# Patient Record
Sex: MALE | Race: OTHER | Employment: UNEMPLOYED | URBAN - METROPOLITAN AREA
[De-identification: names, ages, dates, MRNs, and addresses within clinical notes are randomized per-mention and may not be internally consistent; named-entity substitution may affect disease eponyms.]

---

## 2021-10-18 ENCOUNTER — APPOINTMENT (OUTPATIENT)
Dept: CT IMAGING | Age: 26
End: 2021-10-18
Payer: MEDICAID

## 2021-10-18 ENCOUNTER — HOSPITAL ENCOUNTER (EMERGENCY)
Age: 26
Discharge: LEFT BEFORE DISCHARGE | End: 2021-10-19
Attending: STUDENT IN AN ORGANIZED HEALTH CARE EDUCATION/TRAINING PROGRAM
Payer: MEDICAID

## 2021-10-18 VITALS
HEART RATE: 85 BPM | OXYGEN SATURATION: 100 % | DIASTOLIC BLOOD PRESSURE: 64 MMHG | WEIGHT: 200 LBS | TEMPERATURE: 98.2 F | HEIGHT: 70 IN | RESPIRATION RATE: 12 BRPM | BODY MASS INDEX: 28.63 KG/M2 | SYSTOLIC BLOOD PRESSURE: 110 MMHG

## 2021-10-18 DIAGNOSIS — M27.2 MANDIBULAR ABSCESS: Primary | ICD-10-CM

## 2021-10-18 LAB
ALBUMIN SERPL-MCNC: 4.1 GM/DL (ref 3.4–5)
ALP BLD-CCNC: 82 IU/L (ref 40–128)
ALT SERPL-CCNC: 11 U/L (ref 10–40)
ANION GAP SERPL CALCULATED.3IONS-SCNC: 10 MMOL/L (ref 4–16)
AST SERPL-CCNC: 13 IU/L (ref 15–37)
BASOPHILS ABSOLUTE: 0.1 K/CU MM
BASOPHILS RELATIVE PERCENT: 0.3 % (ref 0–1)
BILIRUB SERPL-MCNC: 1.5 MG/DL (ref 0–1)
BUN BLDV-MCNC: 9 MG/DL (ref 6–23)
CALCIUM SERPL-MCNC: 9 MG/DL (ref 8.3–10.6)
CHLORIDE BLD-SCNC: 94 MMOL/L (ref 99–110)
CO2: 25 MMOL/L (ref 21–32)
CREAT SERPL-MCNC: 0.8 MG/DL (ref 0.9–1.3)
DIFFERENTIAL TYPE: ABNORMAL
EOSINOPHILS ABSOLUTE: 0.5 K/CU MM
EOSINOPHILS RELATIVE PERCENT: 3.2 % (ref 0–3)
GFR AFRICAN AMERICAN: >60 ML/MIN/1.73M2
GFR NON-AFRICAN AMERICAN: >60 ML/MIN/1.73M2
GLUCOSE BLD-MCNC: 100 MG/DL (ref 70–99)
HCT VFR BLD CALC: 38.7 % (ref 42–52)
HEMOGLOBIN: 12.5 GM/DL (ref 13.5–18)
IMMATURE NEUTROPHIL %: 0.3 % (ref 0–0.43)
LACTATE: 1.1 MMOL/L (ref 0.4–2)
LYMPHOCYTES ABSOLUTE: 2.3 K/CU MM
LYMPHOCYTES RELATIVE PERCENT: 15.3 % (ref 24–44)
MCH RBC QN AUTO: 30.3 PG (ref 27–31)
MCHC RBC AUTO-ENTMCNC: 32.3 % (ref 32–36)
MCV RBC AUTO: 93.9 FL (ref 78–100)
MONOCYTES ABSOLUTE: 1.3 K/CU MM
MONOCYTES RELATIVE PERCENT: 8.4 % (ref 0–4)
NUCLEATED RBC %: 0 %
PDW BLD-RTO: 12.5 % (ref 11.7–14.9)
PLATELET # BLD: 326 K/CU MM (ref 140–440)
PMV BLD AUTO: 10.1 FL (ref 7.5–11.1)
POTASSIUM SERPL-SCNC: 3.6 MMOL/L (ref 3.5–5.1)
RBC # BLD: 4.12 M/CU MM (ref 4.6–6.2)
SEGMENTED NEUTROPHILS ABSOLUTE COUNT: 10.9 K/CU MM
SEGMENTED NEUTROPHILS RELATIVE PERCENT: 72.5 % (ref 36–66)
SODIUM BLD-SCNC: 129 MMOL/L (ref 135–145)
TOTAL IMMATURE NEUTOROPHIL: 0.04 K/CU MM
TOTAL NUCLEATED RBC: 0 K/CU MM
TOTAL PROTEIN: 7.1 GM/DL (ref 6.4–8.2)
WBC # BLD: 15.1 K/CU MM (ref 4–10.5)

## 2021-10-18 PROCEDURE — 2580000003 HC RX 258: Performed by: STUDENT IN AN ORGANIZED HEALTH CARE EDUCATION/TRAINING PROGRAM

## 2021-10-18 PROCEDURE — 99284 EMERGENCY DEPT VISIT MOD MDM: CPT

## 2021-10-18 PROCEDURE — 96374 THER/PROPH/DIAG INJ IV PUSH: CPT

## 2021-10-18 PROCEDURE — 6360000004 HC RX CONTRAST MEDICATION: Performed by: STUDENT IN AN ORGANIZED HEALTH CARE EDUCATION/TRAINING PROGRAM

## 2021-10-18 PROCEDURE — 6360000002 HC RX W HCPCS: Performed by: STUDENT IN AN ORGANIZED HEALTH CARE EDUCATION/TRAINING PROGRAM

## 2021-10-18 PROCEDURE — 83605 ASSAY OF LACTIC ACID: CPT

## 2021-10-18 PROCEDURE — 85025 COMPLETE CBC W/AUTO DIFF WBC: CPT

## 2021-10-18 PROCEDURE — 96361 HYDRATE IV INFUSION ADD-ON: CPT

## 2021-10-18 PROCEDURE — 96375 TX/PRO/DX INJ NEW DRUG ADDON: CPT

## 2021-10-18 PROCEDURE — 70487 CT MAXILLOFACIAL W/DYE: CPT

## 2021-10-18 PROCEDURE — 6360000002 HC RX W HCPCS: Performed by: PHYSICIAN ASSISTANT

## 2021-10-18 PROCEDURE — 80053 COMPREHEN METABOLIC PANEL: CPT

## 2021-10-18 RX ORDER — KETOROLAC TROMETHAMINE 30 MG/ML
30 INJECTION, SOLUTION INTRAMUSCULAR; INTRAVENOUS ONCE
Status: COMPLETED | OUTPATIENT
Start: 2021-10-18 | End: 2021-10-18

## 2021-10-18 RX ORDER — FENTANYL CITRATE 50 UG/ML
50 INJECTION, SOLUTION INTRAMUSCULAR; INTRAVENOUS ONCE
Status: COMPLETED | OUTPATIENT
Start: 2021-10-18 | End: 2021-10-18

## 2021-10-18 RX ORDER — DEXAMETHASONE SODIUM PHOSPHATE 10 MG/ML
10 INJECTION, SOLUTION INTRAMUSCULAR; INTRAVENOUS EVERY 6 HOURS
Status: DISCONTINUED | OUTPATIENT
Start: 2021-10-18 | End: 2021-10-19 | Stop reason: HOSPADM

## 2021-10-18 RX ORDER — 0.9 % SODIUM CHLORIDE 0.9 %
500 INTRAVENOUS SOLUTION INTRAVENOUS ONCE
Status: COMPLETED | OUTPATIENT
Start: 2021-10-18 | End: 2021-10-19

## 2021-10-18 RX ADMIN — FENTANYL CITRATE 50 MCG: 0.05 INJECTION, SOLUTION INTRAMUSCULAR; INTRAVENOUS at 20:30

## 2021-10-18 RX ADMIN — SODIUM CHLORIDE 500 ML: 9 INJECTION, SOLUTION INTRAVENOUS at 21:42

## 2021-10-18 RX ADMIN — IOPAMIDOL 75 ML: 755 INJECTION, SOLUTION INTRAVENOUS at 20:08

## 2021-10-18 RX ADMIN — KETOROLAC TROMETHAMINE 30 MG: 30 INJECTION, SOLUTION INTRAMUSCULAR; INTRAVENOUS at 20:30

## 2021-10-18 RX ADMIN — DEXAMETHASONE SODIUM PHOSPHATE 10 MG: 10 INJECTION, SOLUTION INTRAMUSCULAR; INTRAVENOUS at 23:34

## 2021-10-18 ASSESSMENT — PAIN SCALES - GENERAL
PAINLEVEL_OUTOF10: 5
PAINLEVEL_OUTOF10: 7
PAINLEVEL_OUTOF10: 6

## 2021-10-18 ASSESSMENT — PAIN DESCRIPTION - DESCRIPTORS: DESCRIPTORS: SHARP

## 2021-10-18 ASSESSMENT — PAIN DESCRIPTION - ORIENTATION: ORIENTATION: LEFT

## 2021-10-18 ASSESSMENT — PAIN DESCRIPTION - ONSET: ONSET: ON-GOING

## 2021-10-18 ASSESSMENT — PAIN DESCRIPTION - PAIN TYPE: TYPE: ACUTE PAIN

## 2021-10-18 ASSESSMENT — PAIN DESCRIPTION - LOCATION: LOCATION: MOUTH

## 2021-10-18 ASSESSMENT — PAIN DESCRIPTION - PROGRESSION: CLINICAL_PROGRESSION: GRADUALLY WORSENING

## 2021-10-18 ASSESSMENT — PAIN DESCRIPTION - FREQUENCY: FREQUENCY: INTERMITTENT

## 2021-10-18 NOTE — ED PROVIDER NOTES
As PA-in-triage, I performed a medical screening history and physical exam on this patient. HISTORY OF PRESENT ILLNESS  Marcial Sparks is a 22 y.o. male presents for left lower dental pain and facial swelling worsening over last 3 days. States symptoms began aroudn left lower molar with previous silver filling placement but has had worsening facial swelling to the jaw with pain and swelling to the submental region. States went to family dentist today who sent him to ED for imaging and IV abx. PHYSICAL EXAM  /64   Pulse 85   Temp 98.2 °F (36.8 °C) (Oral)   Resp 12   Ht 5' 10\" (1.778 m)   Wt 200 lb (90.7 kg)   SpO2 100%   BMI 28.70 kg/m²     On exam, the patient appears well-hydrated, well-nourished, and in no acute distress. Mucous membranes are moist. Speech is clear. Breathing is unlabored. Skin is dry. Mental status is normal. The patient has normal gait, moves all extremities, and is without facial droop. Moderate left lower jaw swelling with mild trismus. Pain to palpation of submental region with mild induration. Sublingual floor is soft/nontender. Labs, imaging and medications ordered at triage. Please see ED provider's note for patient complete ED evaluation, labs/imaging interpretation and final disposition/clinical impression.          Catina Musa PA-C  10/18/21 2185

## 2021-10-19 RX ORDER — PREDNISONE 50 MG/1
50 TABLET ORAL DAILY
Qty: 5 TABLET | Refills: 0 | Status: SHIPPED | OUTPATIENT
Start: 2021-10-19 | End: 2021-10-24

## 2021-10-19 RX ORDER — CLINDAMYCIN HYDROCHLORIDE 150 MG/1
450 CAPSULE ORAL 3 TIMES DAILY
Qty: 90 CAPSULE | Refills: 0 | Status: SHIPPED | OUTPATIENT
Start: 2021-10-19 | End: 2021-10-29

## 2021-11-08 NOTE — ED PROVIDER NOTES
Emergency Department Encounter    Patient: Jeanne Verde  MRN: [id number]  : 1995  Date of Evaluation: 2021  ED Provider:  Guy Welsh MD    Triage Chief Complaint:   Dental Pain (swelling ans pain to right side of face )    [place]:  Jeanne Verde is a 22 y.o. male presenting with left-sided dental pain and facial swelling. Patient states over the past 2 days has had increasing pain over one of his left mandibular molars began having significantly increased swelling today and over the left side of his face into his submandibular area. Patient denies shortness of breath. States he is not having difficulty swallowing or handling secretions. Denies fevers or chills. Denies recent falls or trauma. States he is trying to get into a dentist around town but has not been unable to do so. States the pain is a 10 out of 10, constant, stabbin/throbbing worse with palpation without relieving factors. Denies any auditory changes, visual changes, headache, blurred vision, focal neuro deficits, motor or sensory changes, chest pain or shortness of breath, abdominal pain, change in urination, change in bowel habits. ROS - see HPI, below listed is current ROS at time of my eval:  At least 10 point review of system reviewed, negative other HPI. No past medical history on file. No past surgical history on file. No family history on file.   Social History     Socioeconomic History    Marital status: Single     Spouse name: Not on file    Number of children: Not on file    Years of education: Not on file    Highest education level: Not on file   Occupational History    Not on file   Tobacco Use    Smoking status: Not on file    Smokeless tobacco: Not on file   Substance and Sexual Activity    Alcohol use: Not on file    Drug use: Not on file    Sexual activity: Not on file   Other Topics Concern    Not on file   Social History Narrative    Not on file     Social Determinants of Health Financial Resource Strain:     Difficulty of Paying Living Expenses: Not on file   Food Insecurity:     Worried About Running Out of Food in the Last Year: Not on file    Radha of Food in the Last Year: Not on file   Transportation Needs:     Lack of Transportation (Medical): Not on file    Lack of Transportation (Non-Medical): Not on file   Physical Activity:     Days of Exercise per Week: Not on file    Minutes of Exercise per Session: Not on file   Stress:     Feeling of Stress : Not on file   Social Connections:     Frequency of Communication with Friends and Family: Not on file    Frequency of Social Gatherings with Friends and Family: Not on file    Attends [employer] Services: Not on file    Active Member of [address] [place] [address] [employer] or Organizations: Not on file    Attends Club or Organization Meetings: Not on file    Marital Status: Not on file   Intimate Partner Violence:     Fear of Current or Ex-Partner: Not on file    Emotionally Abused: Not on file    Physically Abused: Not on file    Sexually Abused: Not on file   Housing Stability:     Unable to Pay for Housing in the Last Year: Not on file    Number of [place] in the Last Year: Not on file    Unstable Housing in the Last Year: Not on file     No current facility-administered medications for this encounter. No current outpatient medications on file. No Known Allergies    Nursing Notes Reviewed    Physical Exam:  Triage VS:    ED Triage Vitals   Enc Vitals Group      BP 10/18/21 1451 110/64      Pulse 10/18/21 1451 85      Resp 10/18/21 1456 12      Temp 10/18/21 1451 98.2 °F (36.8 °C)      Temp Source 10/18/21 1451 Oral      SpO2 10/18/21 1456 100 %      Weight 10/18/21 1451 200 lb (90.7 kg)      Height 10/18/21 1451 5' 10\" (1.778 m)      Head Circumference --       Peak Flow --       Pain Score --       Pain Loc --       Pain Edu? --       Excl.  in [address]? --        My pulse ox interpretation is - normal    General appearance:  No acute distress. Skin:  Warm. Dry. Eye:  Extraocular movements intact. Ears, nose, mouth and throat:  Oral mucosa moist   Neck:  Trachea midline. Significant swelling over the left side of the face with tenderness palpation over the left mandible extending into the left submandibular area, there is some mild fluctuance, patient has point tenderness to palpation along left mandibular molar, no obvious drainable abscess on the gumline, posterior pharynx is clear, uvula is midline and posterior pharynx is symmetric, no significant lymphadenopathy palpated, no tenderness to palpation along the neck other than the submandibular area, patient appears well. Exam lungs are clear to auscultation without stridor or wheezing  Extremity:  No swelling. Normal ROM     Heart:  Regular rate and rhythm, normal S1 & S2, no extra heart sounds. Perfusion:  intact  Respiratory:  Lungs clear to auscultation bilaterally. Respirations nonlabored. Abdominal:  Normal bowel sounds. Soft. Nontender. Non distended. Back:  No CVA tenderness to palpation     Neurological:  Alert and oriented times 3. No focal neuro deficits.              Psychiatric:  Appropriate    I have reviewed and interpreted all of the currently available lab results from this visit (if applicable):  Results for orders placed or performed during the hospital encounter of 10/18/21   CBC auto diff   Result Value Ref Range    WBC 15.1 (H) 4.0 - 10.5 K/CU MM    RBC 4.12 (L) 4.6 - 6.2 M/CU MM    Hemoglobin 12.5 (L) 13.5 - 18.0 GM/DL    Hematocrit 38.7 (L) 42 - 52 %    MCV 93.9 78 - 100 FL    MCH 30.3 27 - 31 PG    MCHC 32.3 32.0 - 36.0 %    RDW 12.5 11.7 - 14.9 %    Platelets 594 037 - 207 K/CU MM    MPV 10.1 7.5 - 11.1 FL    Differential Type AUTOMATED DIFFERENTIAL     Segs Relative 72.5 (H) 36 - 66 %    Lymphocytes % 15.3 (L) 24 - 44 %    Monocytes % 8.4 (H) 0 - 4 %    Eosinophils % 3.2 (H) 0 - 3 %    Basophils % 0.3 0 - 1 %    Segs Absolute 10.9 K/CU MM Lymphocytes Absolute 2.3 K/CU MM    Monocytes Absolute 1.3 K/CU MM    Eosinophils Absolute 0.5 K/CU MM    Basophils Absolute 0.1 K/CU MM    Nucleated RBC % 0.0 %    Total Nucleated RBC 0.0 K/CU MM    Total Immature Neutrophil 0.04 K/CU MM    Immature Neutrophil % 0.3 0 - 0.43 %   CMP   Result Value Ref Range    Sodium 129 (L) 135 - 145 MMOL/L    Potassium 3.6 3.5 - 5.1 MMOL/L    Chloride 94 (L) 99 - 110 mMol/L    CO2 25 21 - 32 MMOL/L    BUN 9 6 - 23 MG/DL    CREATININE 0.8 (L) 0.9 - 1.3 MG/DL    Glucose 100 (H) 70 - 99 MG/DL    Calcium 9.0 8.3 - 10.6 MG/DL    Albumin 4.1 3.4 - 5.0 GM/DL    Total Protein 7.1 6.4 - 8.2 GM/DL    Total Bilirubin 1.5 (H) 0.0 - 1.0 MG/DL    ALT 11 10 - 40 U/L    AST 13 (L) 15 - 37 IU/L    Alkaline Phosphatase 82 40 - 128 IU/L    GFR Non-African American >60 >60 mL/min/1.73m2    GFR African American >60 >60 mL/min/1.73m2    Anion Gap 10 4 - 16   Lactic Acid, Plasma   Result Value Ref Range    Lactate 1.1 0.4 - 2.0 mMOL/L      Radiographs (if obtained):  Radiologist's Report Reviewed:  CT FACIAL BONES W CONTRAST    Result Date: 10/18/2021  EXAMINATION: CT OF THE FACE WITH CONTRAST 10/18/2021 TECHNIQUE: CT of the face was performed with the administration of intravenous contrast. Multiplanar reformatted images are provided for review. Dose modulation, iterative reconstruction, and/or weight based adjustment of the mA/kV was utilized to reduce the radiation dose to as low as reasonably achievable. COMPARISON: None.  HISTORY: ORDERING SYSTEM PROVIDED HISTORY: left jaw/dental pain, submental swelling and pain eval for ludwigs TECHNOLOGIST PROVIDED HISTORY: Reason for exam:->left jaw/dental pain, submental swelling and pain eval for ludwigs Decision Support Exception - unselect if not a suspected or confirmed emergency medical condition->Emergency Medical Condition (MA) Reason for Exam: left jaw/dental pain, submental swelling and pain eval for ludwigs Acuity: Acute Type of Exam: Initial FINDINGS: PHARYNX/LARYNX: The palatine tonsils are normal in appearance. The tongue is normal in appearance. The valleculae, epiglottis, aryepiglottic folds and pyriform sinuses appear unremarkable. No mass or abscess is seen. SALIVARY GLANDS: The parotid and submandibular glands appear unremarkable. LYMPH NODES: There are enlarged bilateral cervical lymph nodes. SOFT TISSUES: There is extensive skin thickening and subcutaneous stranding overlying the left mandible. There is a small fluid collection along the left vivi mandible measuring up to 1 cm. BRAIN/ORBITS/SINUSES: The visualized portion of the intracranial contents appear unremarkable. The visualized portion of the orbits and mastoid air cells demonstrate no acute abnormality. There is scattered mucosal thickening in the paranasal sinuses. BONES:  There are multiple dental caries with periapical lucency in the left maxilla and bilateral mandible. 1. Left perimandibular abscess measuring up to 1 cm. There is associated soft tissue edema overlying the left mandible. 2. Periodontal disease. 3. Enlarged cervical lymph nodes, likely reactive. MDM:    55-year-old male presenting with left-sided facial swelling and dental pain. History and be seen above. Vitals on presentation are reassuring and patient afebrile satting well on room air. On physical exam patient has significant swelling over the left side of his face extending the submandibular area. Lungs are clear to auscultation without wheezing or stridor. Posterior pharynx is clear and symmetric. CBC reveals a white count of 15,000. Patient is mildly hyponatremic at 129 with a chloride of 94. CT was obtained showing the left perimandibular abscess measuring about 1 cm with associated soft tissue edema underlying the left mandible, periodontal disease as well as enlarged cervical lymph nodes likely reactive.   Discussion with patient would like to admit patient with IV antibiotics and further evaluation and treatment. We do not have any ENT coverage at this time. Did attempt to transfer patient but no feel that facilities were excepting patients at this time. Gave patient Unasyn as well as Decadron in the ED. Discussed with patient that would like to keep him for IV antibiotics and monitoring until we are able to transfer patient. Patient states he does not want to stay in the hospital at this time. I discussed the potential for airway compromise, death as well as worsening infection and patient is understanding and understands the risk and benefits. Patient will be given antibiotics and steroids. Patient states if we are unable to transfer him he does not want to have continued monitoring. States he  would like to be discharged and will drive [facility] to [facility] ED for further evaluation and treatment. I discussed the risk of this and patient is very alert and oriented and able to make his own decisions. Patient signed AMA paperwork patient discharged home. Clinical Impression:  1.  Mandibular abscess      Disposition referral (if applicable):  [facility] Emergency Department  [place] [id number] [phone]    If symptoms worsen    Johan Dolan MD  [address] [facility]  [phone]      Call tomorrow to set up follow-up appointment soon as able    Disposition medications (if applicable):  Discharge Medication List as of 10/19/2021 12:18 AM      START taking these medications    Details   clindamycin (CLEOCIN) 150 MG capsule Take 3 capsules by mouth 3 times daily for 10 days, Disp-90 capsule, R-0Print      predniSONE (DELTASONE) 50 MG tablet Take 1 tablet by mouth daily for 5 days, Disp-5 tablet, R-0Print           ED Provider Disposition Time  DISPOSITION Decision To Discharge 10/19/2021 12:36:19 AM      Comment: Please note this report has been produced using speech recognition software and may contain errors related to that system including errors in grammar, punctuation, and spelling, as well as words and phrases that may be inappropriate. Efforts were made to edit the dictations.         Raymond Mcdaniel MD  11/08/21 2543